# Patient Record
Sex: FEMALE | Race: WHITE | NOT HISPANIC OR LATINO | Employment: FULL TIME | URBAN - METROPOLITAN AREA
[De-identification: names, ages, dates, MRNs, and addresses within clinical notes are randomized per-mention and may not be internally consistent; named-entity substitution may affect disease eponyms.]

---

## 2018-05-15 ENCOUNTER — HOSPITAL ENCOUNTER (OUTPATIENT)
Dept: RADIOLOGY | Facility: HOSPITAL | Age: 43
Discharge: HOME | End: 2018-05-15
Attending: PODIATRIST
Payer: COMMERCIAL

## 2018-05-15 ENCOUNTER — OFFICE VISIT (OUTPATIENT)
Dept: PODIATRY | Facility: CLINIC | Age: 43
End: 2018-05-15
Payer: COMMERCIAL

## 2018-05-15 VITALS — WEIGHT: 115.94 LBS | HEIGHT: 67 IN | BODY MASS INDEX: 18.2 KG/M2

## 2018-05-15 DIAGNOSIS — M79.675 TOE PAIN, LEFT: ICD-10-CM

## 2018-05-15 DIAGNOSIS — L03.032 PARONYCHIA, TOE, LEFT: ICD-10-CM

## 2018-05-15 DIAGNOSIS — L60.0 INGROWN NAIL: Primary | ICD-10-CM

## 2018-05-15 DIAGNOSIS — L60.0 INGROWN NAIL: ICD-10-CM

## 2018-05-15 PROCEDURE — 99203 OFFICE O/P NEW LOW 30 MIN: CPT | Mod: S$GLB,,, | Performed by: PODIATRIST

## 2018-05-15 PROCEDURE — 3008F BODY MASS INDEX DOCD: CPT | Mod: CPTII,S$GLB,, | Performed by: PODIATRIST

## 2018-05-15 PROCEDURE — 73630 X-RAY EXAM OF FOOT: CPT | Mod: TC,PO,LT

## 2018-05-15 PROCEDURE — 73630 X-RAY EXAM OF FOOT: CPT | Mod: 26,LT,, | Performed by: RADIOLOGY

## 2018-05-15 PROCEDURE — 99999 PR PBB SHADOW E&M-EST. PATIENT-LVL III: CPT | Mod: PBBFAC,,, | Performed by: PODIATRIST

## 2018-05-15 RX ORDER — TOBRAMYCIN 3 MG/ML
SOLUTION/ DROPS OPHTHALMIC
Qty: 5 ML | Refills: 3 | Status: SHIPPED | OUTPATIENT
Start: 2018-05-15 | End: 2020-10-12

## 2018-05-15 NOTE — PROGRESS NOTES
Subjective:      Patient ID: Ursula Rosas is a 42 y.o. female.    Chief Complaint: Ingrown Toenail (left big toe nail shows s/s infections started  2+ )    Sharp pain and ingrown nail lateral left big toe.  Gradual onset, worsening over past several weeks, aggravated by increased weight bearing, shoe gear, pressure.  No previous medical treatment.  OTC pain med not helping.  Relates prior trauma and removal same nail in [facility] about a year ago.  Very very anxious.      Review of Systems   Constitution: Negative for chills, diaphoresis, fever, malaise/fatigue and night sweats.   Cardiovascular: Negative for claudication, cyanosis, leg swelling and syncope.   Skin: Positive for nail changes. Negative for color change, dry skin, rash, suspicious lesions and unusual hair distribution.   Musculoskeletal: Negative for falls, joint pain, joint swelling, muscle cramps, muscle weakness and stiffness.   Gastrointestinal: Negative for constipation, diarrhea, nausea and vomiting.   Neurological: Negative for brief paralysis, disturbances in coordination, focal weakness, numbness, paresthesias, sensory change and tremors.           Objective:      Physical Exam   Constitutional: She is oriented to person, place, and time. She appears well-developed and well-nourished. She is cooperative. No distress.   Cardiovascular:   Pulses:       Popliteal pulses are 2+ on the right side, and 2+ on the left side.        Dorsalis pedis pulses are 2+ on the right side, and 2+ on the left side.        Posterior tibial pulses are 2+ on the right side, and 2+ on the left side.   Capillary refill 3 seconds all toes/distal feet, all toes/both feet warm to touch.      Negative lymphadenopathy bilateral popliteal fossa and tarsal tunnel.      Negavie lower extremity edema bilateral.     Musculoskeletal:        Right ankle: She exhibits normal range of motion, no swelling, no ecchymosis, no deformity, no laceration and normal pulse. Achilles tendon  normal. Achilles tendon exhibits no pain, no defect and normal Beavers's test results.   Normal angle, base, station of gait. All ten toes without clubbing, cyanosis, or signs of ischemia.  No pain to palpation bilateral lower extremities.  Range of motion, stability, muscle strength, and muscle tone normal bilateral feet and legs.     Lymphadenopathy:   Negative lymphadenopathy bilateral popliteal fossa and tarsal tunnel.    Negative lymphangitic streaking bilateral feet/ankles/legs.   Neurological: She is alert and oriented to person, place, and time. She has normal strength. She displays no atrophy and no tremor. No sensory deficit. She exhibits normal muscle tone. Gait normal.   Reflex Scores:       Patellar reflexes are 2+ on the right side and 2+ on the left side.       Achilles reflexes are 2+ on the right side and 2+ on the left side.  Negative tinel sign to percussion sural, superficial peroneal, deep peroneal, saphenous, and posterior tibial nerves right and left ankles and feet.    Negative allodynia both feet.   Skin: Skin is warm, dry and intact. Capillary refill takes 2 to 3 seconds. No abrasion, no bruising, no burn, no ecchymosis, no laceration, no lesion and no rash noted. She is not diaphoretic. No cyanosis or erythema. No pallor. Nails show no clubbing.     Visible and palpable ingrowth of toenail lateral border left hallux with pain to palpation, and focal localized erythema and edema,  without ulceration, drainage, pus, tracking, fluctuance, malodor, or cardinal signs infection.    Otherwise, Skin is normal age and health appropriate color, turgor, texture, and temperature bilateral lower extremities without ulceration, hyperpigmentation, discoloration, masses nodules or cords palpated.  No ecchymosis, erythema, edema, or cardinal signs of infection bilateral lower extremities.     Psychiatric: She has a normal mood and affect.             Assessment:       Encounter Diagnoses   Name Primary?     Ingrown nail Yes    Paronychia, toe, left     Toe pain, left          Plan:       Ursula was seen today for ingrown toenail.    Diagnoses and all orders for this visit:    Ingrown nail  -     X-Ray Foot Complete Left; Future    Paronychia, toe, left  -     X-Ray Foot Complete Left; Future    Toe pain, left  -     X-Ray Foot Complete Left; Future    Other orders  -     tobramycin sulfate 0.3% (TOBREX) 0.3 % ophthalmic solution; 1-2 drops topically twice daily to affected toe(s).      I counseled the patient on her conditions, their implications and medical management.        Discussed conservative treatment with shoes of adequate dimensions, material, and style to alleviate symptoms and delay or prevent surgical intervention.    With the patient's permission, I debrided left hallux toenail with a clean nipper and curette, removing all offending nail and debris.  Patient tolerated the procedure well and related significant relief.    Rx tobramycin drops bid.  Cover left hallux all times with band aid changing daily.    xrays left.              Follow-up in about 1 week (around 5/22/2018).

## 2019-01-04 ENCOUNTER — TELEPHONE (OUTPATIENT)
Dept: NEUROLOGY | Facility: CLINIC | Age: 44
End: 2019-01-04

## 2019-01-04 NOTE — TELEPHONE ENCOUNTER
Received call from patient in regards to scheduling appointment. Appointment scheduled. Patient verbalized understanding.

## 2023-07-20 ENCOUNTER — HOSPITAL ENCOUNTER (OUTPATIENT)
Dept: RADIOLOGY | Facility: HOSPITAL | Age: 48
Discharge: HOME | End: 2023-07-20
Attending: ANESTHESIOLOGY
Payer: COMMERCIAL

## 2023-07-20 ENCOUNTER — OFFICE VISIT (OUTPATIENT)
Dept: PAIN MEDICINE | Facility: CLINIC | Age: 48
End: 2023-07-20
Payer: COMMERCIAL

## 2023-07-20 VITALS
HEIGHT: 67 IN | BODY MASS INDEX: 19.58 KG/M2 | WEIGHT: 124.75 LBS | HEART RATE: 66 BPM | DIASTOLIC BLOOD PRESSURE: 66 MMHG | SYSTOLIC BLOOD PRESSURE: 125 MMHG

## 2023-07-20 DIAGNOSIS — M79.652 PAIN IN BOTH THIGHS: ICD-10-CM

## 2023-07-20 DIAGNOSIS — M79.651 PAIN IN BOTH THIGHS: ICD-10-CM

## 2023-07-20 DIAGNOSIS — M79.604 PAIN IN BOTH LOWER EXTREMITIES: Primary | ICD-10-CM

## 2023-07-20 DIAGNOSIS — M79.605 PAIN IN BOTH LOWER EXTREMITIES: Primary | ICD-10-CM

## 2023-07-20 PROCEDURE — 99204 OFFICE O/P NEW MOD 45 MIN: CPT | Mod: S$GLB,,, | Performed by: ANESTHESIOLOGY

## 2023-07-20 PROCEDURE — 99204 PR OFFICE/OUTPT VISIT, NEW, LEVL IV, 45-59 MIN: ICD-10-PCS | Mod: S$GLB,,, | Performed by: ANESTHESIOLOGY

## 2023-07-20 PROCEDURE — 99999 PR PBB SHADOW E&M-NEW PATIENT-LVL IV: ICD-10-PCS | Mod: PBBFAC,,, | Performed by: ANESTHESIOLOGY

## 2023-07-20 PROCEDURE — 99999 PR PBB SHADOW E&M-NEW PATIENT-LVL IV: CPT | Mod: PBBFAC,,, | Performed by: ANESTHESIOLOGY

## 2023-07-20 RX ORDER — LEVOTHYROXINE, LIOTHYRONINE 19; 4.5 UG/1; UG/1
30 TABLET ORAL
COMMUNITY
Start: 2023-06-17 | End: 2023-09-21 | Stop reason: SDUPTHER

## 2023-07-20 NOTE — PROGRESS NOTES
[facility] New Patient Evaluation      Referred by: Dr. Carline Nielsen    PCP:     CC:   Chief Complaint   Patient presents with    Leg Pain      Last 3 PDI Scores 11/1/2016 6/29/2016   Pain Disability Index (PDI) 3 0         HPI:   Ursula Blanco is a 47 y.o. female patient who has a past medical history of Mitral valve prolapse. She presents with bilateral leg pain.  She had trauma to her bilateral anterior thighs and September 2022 when a gait turn style in her thighs.  She reports she had extensive soft tissue pain and hematoma following this.  Today she reports she continues to have discomfort in the right greater than left thigh.  Pain is 3/10, aching, dull, throbbing, tight, deep.  She is not having any radiating pain below her knees.  No numbness or weakness.      Pain Intervention History:      Past Spine Surgical History:      Past and current medications:  Antineuropathics:  NSAIDs:  Physical therapy:  Antidepressants:  Muscle relaxers:  Opioids:  Antiplatelets/Anticoagulants:    History:    Current Outpatient Medications:     NP THYROID 30 mg Tab, Take 30 mg by mouth., Disp: , Rfl:     clonazePAM (KLONOPIN) 1 MG tablet, Take 2 mg by mouth every evening. , Disp: , Rfl:     Past Medical History:   Diagnosis Date    Mitral valve prolapse        Past Surgical History:   Procedure Laterality Date    COSMETIC SURGERY      DILATION AND CURETTAGE OF UTERUS      x 3 - fibroid vs polyp    REDUCTION OF BOTH BREASTS  2017    RHINOPLASTY      2015       History reviewed. No pertinent family history.    Social History     Socioeconomic History    Marital status:    Occupational History    Occupation: pharm rep     Comment: [employer]   Tobacco Use    Smoking status: Never   Substance and Sexual Activity    Alcohol use: Yes     Alcohol/week: 0.0 standard drinks     Comment: few/wk   Social History Narrative    Remarried. 15 yo and 10 yo dtrs b/t pt and ex .        Review of patient's allergies  "indicates:  No Known Allergies    Review of Systems:  Positive for vision change, eye pain, hoarse voice, difficulty sleeping, anxiety, depression.  Balance of review of systems is negative.    Physical Exam:  Vitals:    07/20/23 1030 07/20/23 1033   BP: 137/76 125/66   Pulse: 74 66   Weight: 56.6 kg (124 lb 12.5 oz)    Height: 5' 7" (1.702 m)    PainSc:   3    PainLoc: Leg      Body mass index is 19.54 kg/m².    Gen: NAD  Psych: mood appropriate for given condition  HEENT: eyes anicteric   CV: RRR  HEENT: anicteric   Respiratory: non-labored, no signs of respiratory distress  Abd: non-distended  Skin: warm, dry and intact.  Gait: No antalgic gait.     Sensory:  Intact and symmetrical to light touch in L1-S1 dermatomes bilaterally.    Motor:     Right Left   L2/3 Iliacus Hip flexion  5  5   L3/4 Qudratus Femoris Knee Extension  5  5   L4/5 Tib Anterior Ankle Dorsiflexion   5  5   L5/S1 Extensor Hallicus Longus Great toe extension  5  5   S1/S2 Gastroc/Soleus Plantar Flexion  5  5      Right Left                  Patellar DTR 2+ 2+   Achilles DTR 2+ 2+                      Labs:  Lab Results   Component Value Date    HGBA1C 4.9 07/21/2021       Lab Results   Component Value Date    WBC 6.1 07/21/2021    HGB 13.0 07/21/2021    HCT 38.8 07/21/2021    MCV 96 07/21/2021     07/21/2021       Imaging:  none    Assessment:   Problem List Items Addressed This Visit    None  Visit Diagnoses       Pain in both lower extremities    -  Primary    Relevant Orders    US Lower Extremity Veins Right    US Lower Extremity Veins Left    Pain in both thighs        Relevant Orders    MRI Femur Without Contrast Right    MRI Femur Without Contrast Left    X-Ray Femur 2 View Bilateral              Ursula Blanco is a 47 y.o. female patient who has a past medical history of Mitral valve prolapse. She presents with bilateral leg pain.  She had trauma to her bilateral anterior thighs and September 2022 when a gait turn style in her " thighs.  She reports she had extensive soft tissue pain and hematoma following this.  Today she reports she continues to have discomfort in the right greater than left thigh.  Pain is 3/10, aching, dull, throbbing, tight, deep.  She is not having any radiating pain below her knees.  No numbness or weakness.    - on exam she has full strength of her lower extremities and intact sensation to light touch bilateral L2-S1.  She has some discomfort to palpation over the right greater than left anterior thighs.  She also has some discomfort in the right popliteal fossa and right calf  - I have extremely low suspicion for any lumbar radicular cause of her pain.  Her pain started after trauma when a metal gate on [employer] style hit her thighs.  - I have ordered ultrasounds of her lower extremities to rule out any DVT of her lower extremities given her persistent pain in the setting of a history of lower extremity trauma  - I have ordered x-rays of the bilateral femur to assess for any abnormalities to her osseous anatomy.  I have also ordered MRIs of her bilateral femurs to assess for any persistent soft tissue injury that may be causing her continued pain  - we will call her with the results of the imaging once they are completed    : Not applicable    Emanuel Lehman M.D.  Interventional Pain Medicine / Anesthesiology    This note was completed with dictation software and grammatical errors may exist.

## 2023-07-28 ENCOUNTER — HOSPITAL ENCOUNTER (OUTPATIENT)
Dept: RADIOLOGY | Facility: HOSPITAL | Age: 48
Discharge: HOME | End: 2023-07-28
Attending: ANESTHESIOLOGY
Payer: COMMERCIAL

## 2023-07-28 DIAGNOSIS — M79.605 PAIN IN BOTH LOWER EXTREMITIES: ICD-10-CM

## 2023-07-28 DIAGNOSIS — M79.604 PAIN IN BOTH LOWER EXTREMITIES: ICD-10-CM

## 2023-07-28 DIAGNOSIS — M79.651 PAIN IN BOTH THIGHS: ICD-10-CM

## 2023-07-28 DIAGNOSIS — M79.652 PAIN IN BOTH THIGHS: ICD-10-CM

## 2023-07-28 PROCEDURE — 73552 XR FEMUR 2 VIEW BILATERAL: ICD-10-PCS | Mod: 26,50,, | Performed by: RADIOLOGY

## 2023-07-28 PROCEDURE — 73552 X-RAY EXAM OF FEMUR 2/>: CPT | Mod: 26,50,, | Performed by: RADIOLOGY

## 2023-07-28 PROCEDURE — 73552 X-RAY EXAM OF FEMUR 2/>: CPT | Mod: TC,50,FY,PO

## 2024-10-18 ENCOUNTER — TELEPHONE (OUTPATIENT)
Dept: NEUROLOGY | Facility: CLINIC | Age: 49
End: 2024-10-18
Payer: COMMERCIAL

## 2024-10-18 NOTE — TELEPHONE ENCOUNTER
----- Message from Annelise sent at 10/17/2024  4:44 PM CDT -----  Regarding: Sooner Appointment Request  Contact: patient at [phone]  Type:  Sooner Appointment Request    Name of Caller:  patient at [phone]    When is the first available appointment?  none  Symptoms:  movement disorders    Additional Information:  patient is requesting a doctor, more specifically, Dr. Luz Malone. Please call and advise. Thank you

## 2024-10-18 NOTE — TELEPHONE ENCOUNTER
"Called patient to obtain more information about symptoms. Patient states she tried to schedule with Dr. Duvall but she only sees headaches. Patient states she has not been diagnosed with anything yet but has hemifacial spasms. Explained Dr. Malone is a neuromuscular specialist and does not treat this, she would need to see movement clinic. Patient states she has not been diagnosed yet and was told to see Dr. Malone. Attempted to explain Dr. Malone would not be the appropriate provider. Patient stated "she would not see somebody that is sick. What a waste of time. [facility] are [facility]. Bye dear." Patient disconnected the call before being able to offer any other options.   "